# Patient Record
Sex: FEMALE | Race: BLACK OR AFRICAN AMERICAN | NOT HISPANIC OR LATINO | Employment: OTHER | URBAN - METROPOLITAN AREA
[De-identification: names, ages, dates, MRNs, and addresses within clinical notes are randomized per-mention and may not be internally consistent; named-entity substitution may affect disease eponyms.]

---

## 2018-01-17 PROBLEM — R79.9 ABNORMAL BLOOD CHEMISTRY: Status: ACTIVE | Noted: 2018-01-17

## 2019-01-31 PROBLEM — Z00.00 ROUTINE GENERAL MEDICAL EXAMINATION AT A HEALTH CARE FACILITY: Status: ACTIVE | Noted: 2019-01-31

## 2019-01-31 PROBLEM — E88.819 INSULIN RESISTANCE: Status: ACTIVE | Noted: 2019-01-31

## 2019-02-02 PROBLEM — R94.31 ABNORMAL EKG: Status: ACTIVE | Noted: 2019-02-02

## 2019-02-02 PROBLEM — I10 ESSENTIAL HYPERTENSION, BENIGN: Status: ACTIVE | Noted: 2019-02-02

## 2019-02-02 PROBLEM — E03.9 ACQUIRED HYPOTHYROIDISM: Status: ACTIVE | Noted: 2019-02-02

## 2019-02-02 PROBLEM — E55.9 VITAMIN D DEFICIENCY: Status: ACTIVE | Noted: 2019-02-02

## 2019-02-05 PROBLEM — M81.0 AGE-RELATED OSTEOPOROSIS WITHOUT CURRENT PATHOLOGICAL FRACTURE: Status: ACTIVE | Noted: 2019-02-05

## 2019-02-25 PROBLEM — R79.89 ELEVATED PARATHYROID HORMONE: Status: ACTIVE | Noted: 2019-02-25

## 2019-03-11 ENCOUNTER — TELEPHONE (OUTPATIENT)
Dept: ENDOCRINOLOGY | Facility: CLINIC | Age: 59
End: 2019-03-11

## 2019-05-06 PROBLEM — Z00.00 ROUTINE GENERAL MEDICAL EXAMINATION AT A HEALTH CARE FACILITY: Status: RESOLVED | Noted: 2019-01-31 | Resolved: 2019-05-06

## 2019-07-20 PROBLEM — E78.5 HYPERLIPIDEMIA: Status: ACTIVE | Noted: 2019-07-20

## 2019-07-30 ENCOUNTER — OFFICE VISIT (OUTPATIENT)
Dept: ENDOCRINOLOGY | Facility: CLINIC | Age: 59
End: 2019-07-30
Payer: COMMERCIAL

## 2019-07-30 VITALS
SYSTOLIC BLOOD PRESSURE: 124 MMHG | OXYGEN SATURATION: 97 % | BODY MASS INDEX: 33.39 KG/M2 | WEIGHT: 181.44 LBS | HEIGHT: 62 IN | HEART RATE: 68 BPM | DIASTOLIC BLOOD PRESSURE: 78 MMHG

## 2019-07-30 DIAGNOSIS — E04.9 GOITER: ICD-10-CM

## 2019-07-30 DIAGNOSIS — R79.89 HIGH SERUM PARATHYROID HORMONE (PTH): Primary | ICD-10-CM

## 2019-07-30 DIAGNOSIS — M81.0 AGE-RELATED OSTEOPOROSIS WITHOUT CURRENT PATHOLOGICAL FRACTURE: ICD-10-CM

## 2019-07-30 PROCEDURE — 99999 PR PBB SHADOW E&M-EST. PATIENT-LVL III: ICD-10-PCS | Mod: PBBFAC,,, | Performed by: INTERNAL MEDICINE

## 2019-07-30 PROCEDURE — 99244 PR OFFICE CONSULTATION,LEVEL IV: ICD-10-PCS | Mod: S$GLB,,, | Performed by: INTERNAL MEDICINE

## 2019-07-30 PROCEDURE — 3008F PR BODY MASS INDEX (BMI) DOCUMENTED: ICD-10-PCS | Mod: CPTII,S$GLB,, | Performed by: INTERNAL MEDICINE

## 2019-07-30 PROCEDURE — 3078F PR MOST RECENT DIASTOLIC BLOOD PRESSURE < 80 MM HG: ICD-10-PCS | Mod: CPTII,S$GLB,, | Performed by: INTERNAL MEDICINE

## 2019-07-30 PROCEDURE — 99999 PR PBB SHADOW E&M-EST. PATIENT-LVL III: CPT | Mod: PBBFAC,,, | Performed by: INTERNAL MEDICINE

## 2019-07-30 PROCEDURE — 99244 OFF/OP CNSLTJ NEW/EST MOD 40: CPT | Mod: S$GLB,,, | Performed by: INTERNAL MEDICINE

## 2019-07-30 PROCEDURE — 3074F PR MOST RECENT SYSTOLIC BLOOD PRESSURE < 130 MM HG: ICD-10-PCS | Mod: CPTII,S$GLB,, | Performed by: INTERNAL MEDICINE

## 2019-07-30 PROCEDURE — 3078F DIAST BP <80 MM HG: CPT | Mod: CPTII,S$GLB,, | Performed by: INTERNAL MEDICINE

## 2019-07-30 PROCEDURE — 3008F BODY MASS INDEX DOCD: CPT | Mod: CPTII,S$GLB,, | Performed by: INTERNAL MEDICINE

## 2019-07-30 PROCEDURE — 3074F SYST BP LT 130 MM HG: CPT | Mod: CPTII,S$GLB,, | Performed by: INTERNAL MEDICINE

## 2019-07-30 NOTE — PATIENT INSTRUCTIONS
Understanding Primary Hyperparathyroidism    The parathyroid glands are 4 tiny glands in the neck. They make parathyroid hormone (PTH). PTH controls the amount of calcium and phosphorus in your blood. Primary hyperparathyroidism is when there is too much PTH in your blood. It occurs when one or more of the glands are too active.  The job of PTH is to tell the body how to control calcium. Too much PTH means the body increases the amount of calcium in the blood. This leads to a problem called hypercalcemia. This is when the amount of calcium in the blood is too high. Hypercalcemia can cause serious health problems.  Causes  Hyperparathyroidism can occur when a parathyroid gland becomes enlarged. It can also occur as a complication of another health conditions, such as kidney failure or rickets. In these conditions, calcium is usually not high. This is called secondary hyperparathyroidism.  Whos at risk  The risk factors for this condition include:  · Being a woman (its less common in men)  · Being older (its more likely to occur with age)  · Having parents or siblings with the condition or other endocrine tumors  · Having certain kidney problems  · Taking certain medicines  · Having had radiation treatment in the head or neck  Symptoms  Symptoms of the condition can include:  · Muscle weakness  · Depression  · Tiredness  · Confusion and memory loss  · Poor memory  · Nausea and vomiting  · Pain in the stomach area (abdomen)  · Hard stools (constipation)  · Stomach ulcers  · Need to urinate often  · Kidney stones  · Joint or bone pain  · Bone disease (osteopenia or osteoporosis), an increase in bone fractures  · High blood pressure  · Increased thirst  Treatment  If primary hyperparathyroidism is not treated, it can get worse over time. Treatments include:  · Surgery. This may be done to remove any enlarged parathyroid glands. This lets the amount of calcium in the blood go back to normal. You may need to take  vitamin D and calcium supplements before the surgery. This will reduce the risk of low calcium after the surgery.   · Medicine. This lowers the amount of parathyroid hormone made by the overactive glands.   You and your healthcare provider can discuss your treatment options. Make sure to ask any questions you have.  Date Last Reviewed: 11/1/2016  © 8537-6253 [employer]. [address] [place] [address], [place] [address]. All rights reserved. This information is not intended as a substitute for professional medical care. Always follow your healthcare professional's instructions.

## 2019-07-30 NOTE — PROGRESS NOTES
""This note will be shared with the patient"Subjective:       Patient ID: Georgette Betancourt is a 59 y.o. female.  Patient is new to me    Records were reviewed      Chief Complaint: elevated parathyroid hormone      Consultation requested by Dr. Joanie Goldstein    HPI    Patient was found to have an elevated PTH this year, calcium has been normal and vitamin-D adequate.  In the past she did have a vitamin-D deficiency    Osteoporosis  Patient complains of osteoporosis. She was diagnosed with osteoporosis by bone density scan in she reports at least 5 years ago 1 most recent done this year shows osteoporosis with lowest bone density in the. Patient denies history of fracture.The cause of osteoporosis is felt to be due to postmenopausal estrogen deficiency.   She is currently being treated with calcium and vitamin D supplementation.  She is currently being treated with bisphosphonates-states she has only been on Fosamax about 4 months and she admits she may miss doses   Osteoporosis Risk Factors   Nonmodifiable  Personal Hx of fracture as an adult: no  Hx of fracture in first-degree relative: no   race: no  Advanced age: no  Female sex: no  Dementia: no  Poor health/frailty: no       See bone density under media, L-spine negative 3.2 T-score  Potentially modifiable:  Tobacco use: no  Low body weight (<127 lbs): no  Estrogen deficiency     early menopause (age <45) or bilateral ovariectomy: no     prolonged premenopausal amenorrhea (>1 yr): no  Low calcium intake (lifelong):  Possibly  Alcoholism: no  Recurrent falls: no  Inadequate physical activity: no    Current calcium and Vit D intake:  Dietary sources:  Not much dairy  Supplements:    on mvi daily and takes calcium once a week    I have reviewed the past medical, family and social history    Review of Systems   Constitutional: Negative for appetite change, fatigue, fever and unexpected weight change.   HENT: Negative for sore throat and trouble " swallowing.    Eyes: Negative for visual disturbance.   Respiratory: Negative for shortness of breath and wheezing.    Cardiovascular: Negative for chest pain, palpitations and leg swelling.   Gastrointestinal: Negative for diarrhea, nausea and vomiting.   Endocrine: Negative for cold intolerance, heat intolerance, polydipsia, polyphagia and polyuria.   Genitourinary: Negative for difficulty urinating, dysuria and menstrual problem.   Musculoskeletal: Negative for arthralgias and joint swelling.   Skin: Negative for rash.   Neurological: Negative for dizziness, weakness, numbness and headaches.   Psychiatric/Behavioral: Negative for confusion, dysphoric mood and sleep disturbance.       Objective:      Physical Exam   Constitutional: She is oriented to person, place, and time. She appears well-developed and well-nourished. No distress.   HENT:   Head: Normocephalic and atraumatic.   Right Ear: External ear normal.   Left Ear: External ear normal.   Nose: Nose normal.   Mouth/Throat: Oropharynx is clear and moist. No oropharyngeal exudate.   Eyes: Pupils are equal, round, and reactive to light. Conjunctivae and EOM are normal. No scleral icterus.   Neck: No JVD present. No tracheal deviation present. Thyromegaly present.   Cardiovascular: Normal rate, regular rhythm, normal heart sounds and intact distal pulses. Exam reveals no gallop and no friction rub.   No murmur heard.  Pulmonary/Chest: Effort normal and breath sounds normal. No respiratory distress. She has no wheezes. She has no rales.   Abdominal: Soft. Bowel sounds are normal. She exhibits no distension and no mass. There is no tenderness. There is no rebound and no guarding. No hernia.   Musculoskeletal: She exhibits no edema or deformity.   Lymphadenopathy:     She has no cervical adenopathy.   Neurological: She is alert and oriented to person, place, and time. She has normal reflexes. No cranial nerve deficit.   Skin: Skin is warm. No rash noted. She is  not diaphoretic. No erythema.   Psychiatric: She has a normal mood and affect. Her behavior is normal.   Vitals reviewed.        Lab Review:   Clinical Support on 05/30/2019   Component Date Value    Vit D, 25-Hydroxy 05/30/2019 50.4     Cholesterol 05/30/2019 207*    Triglycerides 05/30/2019 95     HDL 05/30/2019 57     VLDL Cholesterol Richie 05/30/2019 19     LDL Calculated 05/30/2019 131*    Glucose 05/30/2019 94     BUN, Bld 05/30/2019 10     Creatinine 05/30/2019 0.94     eGFR if non African Amer* 05/30/2019 66     eGFR if African American 05/30/2019 76     BUN/Creatinine Ratio 05/30/2019 11*    Sodium 05/30/2019 141     Potassium 05/30/2019 4.4     Chloride 05/30/2019 100     CO2 05/30/2019 27     Calcium 05/30/2019 9.4     Total Protein 05/30/2019 7.1     Albumin 05/30/2019 4.3     Globulin, Total 05/30/2019 2.8     Albumin/Globulin Ratio 05/30/2019 1.5     Total Bilirubin 05/30/2019 0.6     Alkaline Phosphatase 05/30/2019 89     AST 05/30/2019 22     ALT 05/30/2019 19     A1c 05/30/2019 5.6     Insulin 05/30/2019 CANCELED     Microalb, Ur 05/30/2019 10.5     TSH 05/30/2019 0.062*    T3, Free 05/30/2019 3.4     T4, Free 05/30/2019 1.31    Office Visit on 02/21/2019   Component Date Value    Calcium, Ion 02/21/2019 5.1     PTH, Intact 02/21/2019 98*    Phosphorus 02/21/2019 3.1     Magnesium 02/21/2019 2.4*   Office Visit on 01/31/2019   Component Date Value    Microalbumin Urine Random 01/31/2019 80 mg/l*    Spec Grav UA 01/31/2019 1.020     pH, UA 01/31/2019 6.5     WBC, UA 01/31/2019 500*    Blood, UA 01/31/2019 10*    Nitrite, UA 01/31/2019 neg     Ketones, UA 01/31/2019 neg     Bilirubin 01/31/2019 neg     Urobilinogen, UA 01/31/2019 1*    Protein 01/31/2019 15*    Glucose, UA 01/31/2019 neg     Casts 01/31/2019 None Seen     Bacteria 01/31/2019 1-5     Crystals 01/31/2019 None Seen     White Blood Cells 01/31/2019 20-25*    RBC Cells Counted 01/31/2019  0-2*    A1c 01/31/2019 5.8     Urine Culture, Routine 01/31/2019 Final report     Result 1 01/31/2019 No growth        Assessment:     1. High serum parathyroid hormone (PTH)     2. Age-related osteoporosis without current pathological fracture  PTH, intact    Calcium    Calcium, Timed Urine    Creatinine, urine, timed 24 Hours    Phosphorus   3. Goiter  US Soft Tissue Head Neck Thyroid    I discussed the pathophysiology hyperparathyroidism.  She may have a variant of hyperparathyroidism with normal calcium but in light of her osteoporosis can consider surgical intervention in the future.  She wants to think about surgery.  She has only been on Fosamax for a short time and she has not been compliant.  Encourage compliance but also discussed possibly switching to IV Reclast.  For now can take a multivitamin and vitamin-D daily but may add 1 dose of calcium if her calcium remains normal.  Check labs below    Would avoid suppressed TSH with thyroid hormone replacement to avoid worsening of osteoporosis    Check thyroid ultrasound for further evaluation of enlargement  Plan:   High serum parathyroid hormone (PTH)    Age-related osteoporosis without current pathological fracture  -     PTH, intact; Future; Expected date: 07/30/2019  -     Calcium; Future; Expected date: 07/30/2019  -     Calcium, Timed Urine; Future  -     Creatinine, urine, timed 24 Hours; Future  -     Phosphorus; Future; Expected date: 07/30/2019    Goiter  -     US Soft Tissue Head Neck Thyroid; Future; Expected date: 07/30/2019          No follow-ups on file.

## 2019-07-30 NOTE — LETTER
July 30, 2019      Joanie Goldstein MD  [address]  [place] [address]           [facility]  [address] [facility]  [place] [address]  Phone: [phone]  Fax: [phone]          Patient: Georgette Betancourt   MR Number: [id number]   YOB: 1960   Date of Visit: 7/30/2019       Dear Dr. Joanie Goldstein:    Thank you for referring Georgette Betancourt to me for evaluation. Attached you will find relevant portions of my assessment and plan of care.    If you have questions, please do not hesitate to call me. I look forward to following Georgette Betancourt along with you.    Sincerely,    Judy Sullivan MD    Enclosure  CC:  No Recipients    If you would like to receive this communication electronically, please contact [email or web address] or [phone] to request more information on [employer] access.    For providers and/or their staff who would like to refer a patient to [facility], please contact us through our one-stop-shop provider referral line, [facility], at [phone].    If you feel you have received this communication in error or would no longer like to receive these types of communications, please e-mail [email or web address]

## 2019-07-31 ENCOUNTER — LAB VISIT (OUTPATIENT)
Dept: LAB | Facility: HOSPITAL | Age: 59
End: 2019-07-31
Payer: COMMERCIAL

## 2019-07-31 DIAGNOSIS — M81.0 AGE-RELATED OSTEOPOROSIS WITHOUT CURRENT PATHOLOGICAL FRACTURE: ICD-10-CM

## 2019-07-31 LAB
CALCIUM SERPL-MCNC: 9.9 MG/DL (ref 8.7–10.5)
PHOSPHATE SERPL-MCNC: 3.3 MG/DL (ref 2.7–4.5)
PTH-INTACT SERPL-MCNC: 112 PG/ML (ref 9–77)

## 2019-07-31 PROCEDURE — 83970 ASSAY OF PARATHORMONE: CPT

## 2019-07-31 PROCEDURE — 36415 COLL VENOUS BLD VENIPUNCTURE: CPT

## 2019-07-31 PROCEDURE — 84100 ASSAY OF PHOSPHORUS: CPT

## 2019-07-31 PROCEDURE — 82310 ASSAY OF CALCIUM: CPT

## 2019-08-05 ENCOUNTER — TELEPHONE (OUTPATIENT)
Dept: RADIOLOGY | Facility: HOSPITAL | Age: 59
End: 2019-08-05

## 2019-08-05 ENCOUNTER — LAB VISIT (OUTPATIENT)
Dept: LAB | Facility: HOSPITAL | Age: 59
End: 2019-08-05
Payer: COMMERCIAL

## 2019-08-05 DIAGNOSIS — M81.0 AGE-RELATED OSTEOPOROSIS WITHOUT CURRENT PATHOLOGICAL FRACTURE: ICD-10-CM

## 2019-08-05 PROCEDURE — 82340 ASSAY OF CALCIUM IN URINE: CPT

## 2019-08-05 PROCEDURE — 82570 ASSAY OF URINE CREATININE: CPT

## 2019-08-06 ENCOUNTER — HOSPITAL ENCOUNTER (OUTPATIENT)
Dept: RADIOLOGY | Facility: HOSPITAL | Age: 59
Discharge: HOME | End: 2019-08-06
Attending: INTERNAL MEDICINE
Payer: COMMERCIAL

## 2019-08-06 DIAGNOSIS — E04.9 GOITER: ICD-10-CM

## 2019-08-06 LAB
CALCIUM 24H UR-MRATE: 6 MG/HR (ref 4–12)
CALCIUM UR-MCNC: 7 MG/DL (ref 0–15)
CALCIUM URINE (MG/SPEC): 154 MG/SPEC
CREAT 24H UR-MRATE: 49.5 MG/HR (ref 40–75)
CREAT UR-MCNC: 54 MG/DL (ref 15–325)
CREATININE, URINE (MG/SPEC): 1188 MG/SPEC
URINE COLLECTION DURATION: 24 HR
URINE COLLECTION DURATION: 24 HR
URINE VOLUME: 2200 ML
URINE VOLUME: 2200 ML

## 2019-08-06 PROCEDURE — 76536 US EXAM OF HEAD AND NECK: CPT | Mod: TC

## 2019-08-06 PROCEDURE — 76536 US EXAM OF HEAD AND NECK: CPT | Mod: 26,,, | Performed by: RADIOLOGY

## 2019-08-06 PROCEDURE — 76536 US SOFT TISSUE HEAD NECK THYROID: ICD-10-PCS | Mod: 26,,, | Performed by: RADIOLOGY

## 2019-08-09 ENCOUNTER — TELEPHONE (OUTPATIENT)
Dept: NEUROLOGY | Facility: CLINIC | Age: 59
End: 2019-08-09

## 2019-08-09 NOTE — TELEPHONE ENCOUNTER
----- Message from Judy Sullivan MD sent at 8/7/2019  3:54 PM CDT -----  Notify patient her labs are stable and will continue to follow her PTH and calcium levels over time.  Twenty-four urine was fine and thyroid ultrasound was normal. Please schedule for six-month follow-up appointment

## 2019-08-20 ENCOUNTER — TELEPHONE (OUTPATIENT)
Dept: ENDOCRINOLOGY | Facility: CLINIC | Age: 59
End: 2019-08-20

## 2019-08-20 NOTE — TELEPHONE ENCOUNTER
Notify patient her labs are stable and will continue to follow her PTH and calcium levels over time.  Twenty-four urine was fine and thyroid ultrasound was normal. Please schedule for six-month follow-up appointment    ----- Message from Boaz Gunderson sent at 8/20/2019 12:56 PM CDT -----  Contact: pt   Type:  Patient Returning Call    Who Called:DANTE BISHOP  Who Left Message for Patient: nurse   Does the patient know what this is regarding?:   Would the patient rather a call back or a response via [facility]?  Call   Best Call Back Number: [phone]    Additional Information:

## 2019-08-20 NOTE — TELEPHONE ENCOUNTER
Notify patient her labs are stable and will continue to follow her PTH and calcium levels over time.  Twenty-four urine was fine and thyroid ultrasound was normal. Please schedule for six-month follow-up appointment

## 2019-08-21 ENCOUNTER — TELEPHONE (OUTPATIENT)
Dept: ENDOCRINOLOGY | Facility: CLINIC | Age: 59
End: 2019-08-21

## 2019-08-21 NOTE — TELEPHONE ENCOUNTER
Called pt and relayed lab results   Pt verbalized  understanding and will make 6mo f/u appt through pt portal

## 2020-10-12 PROBLEM — R79.9 ABNORMAL BLOOD CHEMISTRY: Status: RESOLVED | Noted: 2018-01-17 | Resolved: 2020-10-12

## 2021-10-26 ENCOUNTER — OFFICE VISIT (OUTPATIENT)
Dept: GASTROENTEROLOGY | Facility: CLINIC | Age: 61
End: 2021-10-26
Payer: COMMERCIAL

## 2021-10-26 VITALS
HEART RATE: 64 BPM | SYSTOLIC BLOOD PRESSURE: 130 MMHG | WEIGHT: 171.06 LBS | BODY MASS INDEX: 30.3 KG/M2 | DIASTOLIC BLOOD PRESSURE: 76 MMHG

## 2021-10-26 DIAGNOSIS — I10 HYPERTENSION, UNSPECIFIED TYPE: ICD-10-CM

## 2021-10-26 DIAGNOSIS — Z12.11 COLON CANCER SCREENING: Primary | ICD-10-CM

## 2021-10-26 DIAGNOSIS — Z80.0 FAMILY HISTORY OF COLON CANCER: ICD-10-CM

## 2021-10-26 PROCEDURE — 99203 OFFICE O/P NEW LOW 30 MIN: CPT | Mod: S$GLB,,, | Performed by: NURSE PRACTITIONER

## 2021-10-26 PROCEDURE — 3061F PR NEG MICROALBUMINURIA RESULT DOCUMENTED/REVIEW: ICD-10-PCS | Mod: CPTII,S$GLB,, | Performed by: NURSE PRACTITIONER

## 2021-10-26 PROCEDURE — 1160F RVW MEDS BY RX/DR IN RCRD: CPT | Mod: CPTII,S$GLB,, | Performed by: NURSE PRACTITIONER

## 2021-10-26 PROCEDURE — 3066F NEPHROPATHY DOC TX: CPT | Mod: CPTII,S$GLB,, | Performed by: NURSE PRACTITIONER

## 2021-10-26 PROCEDURE — 1159F PR MEDICATION LIST DOCUMENTED IN MEDICAL RECORD: ICD-10-PCS | Mod: CPTII,S$GLB,, | Performed by: NURSE PRACTITIONER

## 2021-10-26 PROCEDURE — 3078F PR MOST RECENT DIASTOLIC BLOOD PRESSURE < 80 MM HG: ICD-10-PCS | Mod: CPTII,S$GLB,, | Performed by: NURSE PRACTITIONER

## 2021-10-26 PROCEDURE — 3008F BODY MASS INDEX DOCD: CPT | Mod: CPTII,S$GLB,, | Performed by: NURSE PRACTITIONER

## 2021-10-26 PROCEDURE — 3008F PR BODY MASS INDEX (BMI) DOCUMENTED: ICD-10-PCS | Mod: CPTII,S$GLB,, | Performed by: NURSE PRACTITIONER

## 2021-10-26 PROCEDURE — 3066F PR DOCUMENTATION OF TREATMENT FOR NEPHROPATHY: ICD-10-PCS | Mod: CPTII,S$GLB,, | Performed by: NURSE PRACTITIONER

## 2021-10-26 PROCEDURE — 3061F NEG MICROALBUMINURIA REV: CPT | Mod: CPTII,S$GLB,, | Performed by: NURSE PRACTITIONER

## 2021-10-26 PROCEDURE — 3075F PR MOST RECENT SYSTOLIC BLOOD PRESS GE 130-139MM HG: ICD-10-PCS | Mod: CPTII,S$GLB,, | Performed by: NURSE PRACTITIONER

## 2021-10-26 PROCEDURE — 99203 PR OFFICE/OUTPT VISIT, NEW, LEVL III, 30-44 MIN: ICD-10-PCS | Mod: S$GLB,,, | Performed by: NURSE PRACTITIONER

## 2021-10-26 PROCEDURE — 3075F SYST BP GE 130 - 139MM HG: CPT | Mod: CPTII,S$GLB,, | Performed by: NURSE PRACTITIONER

## 2021-10-26 PROCEDURE — 99999 PR PBB SHADOW E&M-EST. PATIENT-LVL III: ICD-10-PCS | Mod: PBBFAC,,, | Performed by: NURSE PRACTITIONER

## 2021-10-26 PROCEDURE — 3078F DIAST BP <80 MM HG: CPT | Mod: CPTII,S$GLB,, | Performed by: NURSE PRACTITIONER

## 2021-10-26 PROCEDURE — 99999 PR PBB SHADOW E&M-EST. PATIENT-LVL III: CPT | Mod: PBBFAC,,, | Performed by: NURSE PRACTITIONER

## 2021-10-26 PROCEDURE — 1159F MED LIST DOCD IN RCRD: CPT | Mod: CPTII,S$GLB,, | Performed by: NURSE PRACTITIONER

## 2021-10-26 PROCEDURE — 1160F PR REVIEW ALL MEDS BY PRESCRIBER/CLIN PHARMACIST DOCUMENTED: ICD-10-PCS | Mod: CPTII,S$GLB,, | Performed by: NURSE PRACTITIONER

## 2021-10-26 RX ORDER — SOD SULF/POT CHLORIDE/MAG SULF 1.479 G
12 TABLET ORAL DAILY
Qty: 24 TABLET | Refills: 0 | Status: SHIPPED | OUTPATIENT
Start: 2021-10-26 | End: 2022-07-05

## 2021-11-16 ENCOUNTER — TELEPHONE (OUTPATIENT)
Dept: PREADMISSION TESTING | Facility: HOSPITAL | Age: 61
End: 2021-11-16
Payer: COMMERCIAL

## 2021-11-19 ENCOUNTER — ANESTHESIA EVENT (OUTPATIENT)
Dept: ENDOSCOPY | Facility: HOSPITAL | Age: 61
End: 2021-11-19
Payer: COMMERCIAL

## 2021-11-23 ENCOUNTER — HOSPITAL ENCOUNTER (OUTPATIENT)
Facility: HOSPITAL | Age: 61
Discharge: HOME | End: 2021-11-23
Attending: INTERNAL MEDICINE | Admitting: INTERNAL MEDICINE
Payer: COMMERCIAL

## 2021-11-23 ENCOUNTER — ANESTHESIA (OUTPATIENT)
Dept: ENDOSCOPY | Facility: HOSPITAL | Age: 61
End: 2021-11-23
Payer: COMMERCIAL

## 2021-11-23 VITALS
RESPIRATION RATE: 17 BRPM | WEIGHT: 172.63 LBS | HEIGHT: 63 IN | DIASTOLIC BLOOD PRESSURE: 73 MMHG | TEMPERATURE: 97 F | OXYGEN SATURATION: 100 % | SYSTOLIC BLOOD PRESSURE: 150 MMHG | HEART RATE: 65 BPM | BODY MASS INDEX: 30.59 KG/M2

## 2021-11-23 DIAGNOSIS — Z12.11 COLON CANCER SCREENING: Primary | ICD-10-CM

## 2021-11-23 PROCEDURE — 37000008 HC ANESTHESIA 1ST 15 MINUTES: Performed by: INTERNAL MEDICINE

## 2021-11-23 PROCEDURE — D9220A PRA ANESTHESIA: ICD-10-PCS | Mod: ,,, | Performed by: STUDENT IN AN ORGANIZED HEALTH CARE EDUCATION/TRAINING PROGRAM

## 2021-11-23 PROCEDURE — G0121 COLON CA SCRN NOT HI RSK IND: HCPCS | Performed by: INTERNAL MEDICINE

## 2021-11-23 PROCEDURE — 25000003 PHARM REV CODE 250: Performed by: NURSE ANESTHETIST, CERTIFIED REGISTERED

## 2021-11-23 PROCEDURE — D9220A PRA ANESTHESIA: Mod: ,,, | Performed by: STUDENT IN AN ORGANIZED HEALTH CARE EDUCATION/TRAINING PROGRAM

## 2021-11-23 PROCEDURE — 63600175 PHARM REV CODE 636 W HCPCS: Performed by: NURSE ANESTHETIST, CERTIFIED REGISTERED

## 2021-11-23 PROCEDURE — G0121 COLON CA SCRN NOT HI RSK IND: ICD-10-PCS | Mod: ,,, | Performed by: INTERNAL MEDICINE

## 2021-11-23 PROCEDURE — G0121 COLON CA SCRN NOT HI RSK IND: HCPCS | Mod: ,,, | Performed by: INTERNAL MEDICINE

## 2021-11-23 PROCEDURE — 37000009 HC ANESTHESIA EA ADD 15 MINS: Performed by: INTERNAL MEDICINE

## 2021-11-23 PROCEDURE — 63600175 PHARM REV CODE 636 W HCPCS: Performed by: INTERNAL MEDICINE

## 2021-11-23 PROCEDURE — D9220A PRA ANESTHESIA: ICD-10-PCS | Mod: ,,, | Performed by: NURSE ANESTHETIST, CERTIFIED REGISTERED

## 2021-11-23 PROCEDURE — D9220A PRA ANESTHESIA: Mod: ,,, | Performed by: NURSE ANESTHETIST, CERTIFIED REGISTERED

## 2021-11-23 RX ORDER — SODIUM CHLORIDE, SODIUM LACTATE, POTASSIUM CHLORIDE, CALCIUM CHLORIDE 600; 310; 30; 20 MG/100ML; MG/100ML; MG/100ML; MG/100ML
INJECTION, SOLUTION INTRAVENOUS CONTINUOUS
Status: DISCONTINUED | OUTPATIENT
Start: 2021-11-23 | End: 2021-11-23 | Stop reason: HOSPADM

## 2021-11-23 RX ORDER — LIDOCAINE HYDROCHLORIDE 20 MG/ML
INJECTION, SOLUTION EPIDURAL; INFILTRATION; INTRACAUDAL; PERINEURAL
Status: DISCONTINUED | OUTPATIENT
Start: 2021-11-23 | End: 2021-11-23

## 2021-11-23 RX ORDER — PROPOFOL 10 MG/ML
VIAL (ML) INTRAVENOUS
Status: DISCONTINUED | OUTPATIENT
Start: 2021-11-23 | End: 2021-11-23

## 2021-11-23 RX ADMIN — GLYCOPYRROLATE 0.2 MG: 0.2 INJECTION, SOLUTION INTRAMUSCULAR; INTRAVITREAL at 09:11

## 2021-11-23 RX ADMIN — PROPOFOL 50 MG: 10 INJECTION, EMULSION INTRAVENOUS at 09:11

## 2021-11-23 RX ADMIN — SODIUM CHLORIDE, SODIUM LACTATE, POTASSIUM CHLORIDE, AND CALCIUM CHLORIDE: 600; 310; 30; 20 INJECTION, SOLUTION INTRAVENOUS at 09:11

## 2021-11-23 RX ADMIN — PROPOFOL 20 MG: 10 INJECTION, EMULSION INTRAVENOUS at 09:11

## 2021-11-23 RX ADMIN — PROPOFOL 100 MG: 10 INJECTION, EMULSION INTRAVENOUS at 09:11

## 2021-11-23 RX ADMIN — LIDOCAINE HYDROCHLORIDE 60 MG: 20 INJECTION, SOLUTION EPIDURAL; INFILTRATION; INTRACAUDAL; PERINEURAL at 09:11

## 2021-11-23 RX ADMIN — PROPOFOL 30 MG: 10 INJECTION, EMULSION INTRAVENOUS at 09:11

## 2021-11-24 LAB — POCT GLUCOSE: 97 MG/DL (ref 70–110)

## 2022-07-05 ENCOUNTER — OFFICE VISIT (OUTPATIENT)
Dept: OBSTETRICS AND GYNECOLOGY | Facility: CLINIC | Age: 62
End: 2022-07-05
Payer: COMMERCIAL

## 2022-07-05 VITALS
SYSTOLIC BLOOD PRESSURE: 128 MMHG | WEIGHT: 172.38 LBS | DIASTOLIC BLOOD PRESSURE: 68 MMHG | BODY MASS INDEX: 30.54 KG/M2

## 2022-07-05 DIAGNOSIS — Z01.419 ENCOUNTER FOR GYNECOLOGICAL EXAMINATION WITHOUT ABNORMAL FINDING: Primary | ICD-10-CM

## 2022-07-05 PROCEDURE — 3044F HG A1C LEVEL LT 7.0%: CPT | Mod: CPTII,S$GLB,, | Performed by: NURSE PRACTITIONER

## 2022-07-05 PROCEDURE — 3074F PR MOST RECENT SYSTOLIC BLOOD PRESSURE < 130 MM HG: ICD-10-PCS | Mod: CPTII,S$GLB,, | Performed by: NURSE PRACTITIONER

## 2022-07-05 PROCEDURE — 3008F PR BODY MASS INDEX (BMI) DOCUMENTED: ICD-10-PCS | Mod: CPTII,S$GLB,, | Performed by: NURSE PRACTITIONER

## 2022-07-05 PROCEDURE — 99386 PREV VISIT NEW AGE 40-64: CPT | Mod: S$GLB,,, | Performed by: NURSE PRACTITIONER

## 2022-07-05 PROCEDURE — 3078F DIAST BP <80 MM HG: CPT | Mod: CPTII,S$GLB,, | Performed by: NURSE PRACTITIONER

## 2022-07-05 PROCEDURE — 3078F PR MOST RECENT DIASTOLIC BLOOD PRESSURE < 80 MM HG: ICD-10-PCS | Mod: CPTII,S$GLB,, | Performed by: NURSE PRACTITIONER

## 2022-07-05 PROCEDURE — 3008F BODY MASS INDEX DOCD: CPT | Mod: CPTII,S$GLB,, | Performed by: NURSE PRACTITIONER

## 2022-07-05 PROCEDURE — 99999 PR PBB SHADOW E&M-EST. PATIENT-LVL III: ICD-10-PCS | Mod: PBBFAC,,, | Performed by: NURSE PRACTITIONER

## 2022-07-05 PROCEDURE — 1159F PR MEDICATION LIST DOCUMENTED IN MEDICAL RECORD: ICD-10-PCS | Mod: CPTII,S$GLB,, | Performed by: NURSE PRACTITIONER

## 2022-07-05 PROCEDURE — 3074F SYST BP LT 130 MM HG: CPT | Mod: CPTII,S$GLB,, | Performed by: NURSE PRACTITIONER

## 2022-07-05 PROCEDURE — 1160F PR REVIEW ALL MEDS BY PRESCRIBER/CLIN PHARMACIST DOCUMENTED: ICD-10-PCS | Mod: CPTII,S$GLB,, | Performed by: NURSE PRACTITIONER

## 2022-07-05 PROCEDURE — 3044F PR MOST RECENT HEMOGLOBIN A1C LEVEL <7.0%: ICD-10-PCS | Mod: CPTII,S$GLB,, | Performed by: NURSE PRACTITIONER

## 2022-07-05 PROCEDURE — 1159F MED LIST DOCD IN RCRD: CPT | Mod: CPTII,S$GLB,, | Performed by: NURSE PRACTITIONER

## 2022-07-05 PROCEDURE — 99386 PR PREVENTIVE VISIT,NEW,40-64: ICD-10-PCS | Mod: S$GLB,,, | Performed by: NURSE PRACTITIONER

## 2022-07-05 PROCEDURE — 1160F RVW MEDS BY RX/DR IN RCRD: CPT | Mod: CPTII,S$GLB,, | Performed by: NURSE PRACTITIONER

## 2022-07-05 PROCEDURE — 99999 PR PBB SHADOW E&M-EST. PATIENT-LVL III: CPT | Mod: PBBFAC,,, | Performed by: NURSE PRACTITIONER

## 2022-07-05 NOTE — PROGRESS NOTES
CC: Well woman exam    Georgette Betancourt is a 62 y.o. female  presents for a well woman exam.  No issues, problems, or complaints.    New pt, was seeing Dr. Li Franz who no longer practices  She [facility] mmg [facility],   Has had hysterectomy retains ovaries     Past Medical History:   Diagnosis Date    Acquired hypothyroidism     Anemia     Encounter for general adult medical examination without abnormal findings 2016    Essential hypertension     Metabolic syndrome X     Osteopenia     Vitamin D deficiency      Past Surgical History:   Procedure Laterality Date    [facility]SY [facility]     [facility] will [facility] with [facility]     SECTION      COLONOSCOPY N/A 2021    Procedure: COLONOSCOPY;  Surgeon: Kami Castellon MD;  Location: [facility];  Service: Endoscopy;  Laterality: N/A;    HYSTERECTOMY  2020    retains ovaries - done for Bleeding with Dr. Franz    TUBAL LIGATION       Family History   Problem Relation Age of Onset    Hypertension Mother     Diabetes Mother     Stroke Father     Heart disease Brother      Social History     Tobacco Use    Smoking status: Former Smoker    Smokeless tobacco: Never Used   Substance Use Topics    Alcohol use: Not Currently    Drug use: No     OB History        3    Para   3    Term   3            AB        Living   3       SAB        IAB        Ectopic        Multiple        Live Births   3                 /68   Wt 78.2 kg (172 lb 6.4 oz)   BMI 30.54 kg/m²     ROS:  GENERAL: Denies weight gain or weight loss. Feeling well overall.   SKIN: Denies rash or lesions.   HEAD: Denies head injury or headache.   NODES: Denies enlarged lymph nodes.   CHEST: Denies chest pain or shortness of breath.   CARDIOVASCULAR: Denies palpitations or left sided chest pain.   ABDOMEN: No abdominal pain, constipation, diarrhea, nausea, vomiting or rectal bleeding.   URINARY: No frequency, dysuria, hematuria, or  burning on urination.  REPRODUCTIVE: See HPI.   BREASTS: The patient performs breast self-examination and denies pain, lumps, or nipple discharge.   HEMATOLOGIC: No easy bruisability or excessive bleeding.   MUSCULOSKELETAL: Denies joint pain or swelling.   NEUROLOGIC: Denies syncope or weakness.   PSYCHIATRIC: Denies depression, anxiety or mood swings.    PE:   APPEARANCE: Well nourished, well developed, in no acute distress.  AFFECT: WNL, alert and oriented x 3.  SKIN: No acne or hirsutism.  NECK: Neck symmetric without masses or thyromegaly.  NODES: No inguinal, cervical, axillary or femoral lymph node enlargement.  CHEST: Good respiratory effort.   ABDOMEN: Soft. No tenderness or masses. No hepatosplenomegaly. No hernias.  BREASTS: Symmetrical, no skin changes or visible lesions. No palpable masses, nipple discharge bilaterally.  PELVIC: Normal external female genitalia without lesions. Normal hair distribution. Adequate perineal body, normal urethral meatus. Vagina atrophic without lesions or discharge. No significant cystocele or rectocele. Bimanual exam shows uterus and cervix to be surgically absent. Adnexa without masses or tenderness.      1. Encounter for gynecological examination without abnormal finding      and PLAN:    Patient was counseled today on A.C.S. Pap guidelines and recommendations for yearly pelvic exams, mammograms and monthly self breast exams; to see her PCP for other health maintenance.

## 2022-07-08 ENCOUNTER — TELEPHONE (OUTPATIENT)
Dept: OBSTETRICS AND GYNECOLOGY | Facility: CLINIC | Age: 62
End: 2022-07-08
Payer: COMMERCIAL

## 2022-07-08 DIAGNOSIS — Z12.31 ENCOUNTER FOR SCREENING MAMMOGRAM FOR BREAST CANCER: Primary | ICD-10-CM

## 2022-07-08 NOTE — TELEPHONE ENCOUNTER
----- Message from Angela Franks sent at 7/8/2022  2:08 PM CDT -----  Please call pt @ [phone] regarding an order to get mammograph at [facility]

## 2022-07-08 NOTE — TELEPHONE ENCOUNTER
mmg order is in chart - please print and pt may have to come    She also can do mmg in [facility]

## 2022-07-13 ENCOUNTER — CLINICAL SUPPORT (OUTPATIENT)
Dept: URGENT CARE | Facility: CLINIC | Age: 62
End: 2022-07-13
Payer: COMMERCIAL

## 2022-07-13 DIAGNOSIS — Z11.52 ENCOUNTER FOR SCREENING FOR COVID-19: Primary | ICD-10-CM

## 2022-07-13 LAB — SARS-COV-2 RNA RESP QL NAA+PROBE: NOT DETECTED

## 2022-07-13 PROCEDURE — U0003 INFECTIOUS AGENT DETECTION BY NUCLEIC ACID (DNA OR RNA); SEVERE ACUTE RESPIRATORY SYNDROME CORONAVIRUS 2 (SARS-COV-2) (CORONAVIRUS DISEASE [COVID-19]), AMPLIFIED PROBE TECHNIQUE, MAKING USE OF HIGH THROUGHPUT TECHNOLOGIES AS DESCRIBED BY CMS-2020-01-R: HCPCS | Performed by: NURSE PRACTITIONER

## 2022-07-13 PROCEDURE — 99211 PR OFFICE/OUTPT VISIT, EST, LEVL I: ICD-10-PCS | Mod: S$GLB,,, | Performed by: NURSE PRACTITIONER

## 2022-07-13 PROCEDURE — 99211 OFF/OP EST MAY X REQ PHY/QHP: CPT | Mod: S$GLB,,, | Performed by: NURSE PRACTITIONER

## 2022-07-13 PROCEDURE — U0005 INFEC AGEN DETEC AMPLI PROBE: HCPCS | Performed by: NURSE PRACTITIONER

## 2023-01-05 ENCOUNTER — PATIENT MESSAGE (OUTPATIENT)
Dept: RESEARCH | Facility: HOSPITAL | Age: 63
End: 2023-01-05
Payer: COMMERCIAL

## 2023-03-13 ENCOUNTER — PATIENT MESSAGE (OUTPATIENT)
Dept: PAIN MEDICINE | Facility: CLINIC | Age: 63
End: 2023-03-13
Payer: COMMERCIAL

## 2023-08-08 ENCOUNTER — TELEPHONE (OUTPATIENT)
Dept: OBSTETRICS AND GYNECOLOGY | Facility: CLINIC | Age: 63
End: 2023-08-08
Payer: COMMERCIAL

## 2023-08-08 NOTE — TELEPHONE ENCOUNTER
Left message for patient to return call to [phone].     Pt needs annual exam in order to schedule mammogram

## 2023-08-08 NOTE — TELEPHONE ENCOUNTER
----- Message from Marilee Magana sent at 8/8/2023  9:52 AM CDT -----  Contact: Rose Mary  Patient is calling to request mammogram order, please notify pt when order is placed / ready to schedule at .[phone]          Thanks

## 2023-11-09 ENCOUNTER — TELEPHONE (OUTPATIENT)
Dept: OBSTETRICS AND GYNECOLOGY | Facility: CLINIC | Age: 63
End: 2023-11-09
Payer: COMMERCIAL

## 2023-11-28 ENCOUNTER — OFFICE VISIT (OUTPATIENT)
Dept: OBSTETRICS AND GYNECOLOGY | Facility: CLINIC | Age: 63
End: 2023-11-28
Payer: COMMERCIAL

## 2023-11-28 VITALS
BODY MASS INDEX: 30.87 KG/M2 | HEIGHT: 62 IN | DIASTOLIC BLOOD PRESSURE: 76 MMHG | SYSTOLIC BLOOD PRESSURE: 128 MMHG | WEIGHT: 167.75 LBS

## 2023-11-28 DIAGNOSIS — Z12.31 ENCOUNTER FOR SCREENING MAMMOGRAM FOR BREAST CANCER: ICD-10-CM

## 2023-11-28 DIAGNOSIS — Z01.419 ENCOUNTER FOR GYNECOLOGICAL EXAMINATION WITHOUT ABNORMAL FINDING: Primary | ICD-10-CM

## 2023-11-28 PROCEDURE — G0101 CA SCREEN;PELVIC/BREAST EXAM: HCPCS | Mod: PBBFAC | Performed by: NURSE PRACTITIONER

## 2023-11-28 PROCEDURE — G0101 PR CA SCREEN;PELVIC/BREAST EXAM: ICD-10-PCS | Mod: S$PBB,,, | Performed by: NURSE PRACTITIONER

## 2023-11-28 PROCEDURE — G0101 CA SCREEN;PELVIC/BREAST EXAM: HCPCS | Mod: S$PBB,,, | Performed by: NURSE PRACTITIONER

## 2023-11-28 PROCEDURE — 99999 PR PBB SHADOW E&M-EST. PATIENT-LVL III: ICD-10-PCS | Mod: PBBFAC,,, | Performed by: NURSE PRACTITIONER

## 2023-11-28 PROCEDURE — 99999 PR PBB SHADOW E&M-EST. PATIENT-LVL III: CPT | Mod: PBBFAC,,, | Performed by: NURSE PRACTITIONER

## 2023-11-28 PROCEDURE — 99213 OFFICE O/P EST LOW 20 MIN: CPT | Mod: PBBFAC,25 | Performed by: NURSE PRACTITIONER

## 2023-11-28 NOTE — PROGRESS NOTES
"CC: Well woman exam    Georgette Betancourt is a 63 y.o. female  presents for a well woman exam.    Needs mmg scheduled - order placed    Past Medical History:   Diagnosis Date    Acquired hypothyroidism     Anemia     Encounter for general adult medical examination without abnormal findings 2016    Essential hypertension     Metabolic syndrome X     Osteopenia     Vitamin D deficiency      Past Surgical History:   Procedure Laterality Date    BREAST [facility]OPSY Right     [facility] - will do mmg with them     SECTION      COLONOSCOPY N/A 2021    Procedure: COLONOSCOPY;  Surgeon: Kami Castellon MD;  Location: [facility];  Service: Endoscopy;  Laterality: N/A;    HYSTERECTOMY  2020    retains ovaries - done for Bleeding with Dr. Franz    TUBAL LIGATION       Family History   Problem Relation Age of Onset    Hypertension Mother     Diabetes Mother     Stroke Father     Heart disease Brother      Social History     Tobacco Use    Smoking status: Former    Smokeless tobacco: Never   Substance Use Topics    Alcohol use: Not Currently    Drug use: No     OB History          3    Para   3    Term   3            AB        Living   3         SAB        IAB        Ectopic        Multiple        Live Births   3                 /76 (BP Location: Left arm, Patient Position: Sitting, BP Method: Medium (Manual))   Ht 5' 2" (1.575 m)   Wt 76.1 kg (167 lb 12.3 oz)   BMI 30.69 kg/m²     ROS:  GENERAL: Denies weight gain or weight loss. Feeling well overall.   SKIN: Denies rash or lesions.   HEAD: Denies head injury or headache.   NODES: Denies enlarged lymph nodes.   CHEST: Denies chest pain or shortness of breath.   CARDIOVASCULAR: Denies palpitations or left sided chest pain.   ABDOMEN: No abdominal pain, constipation, diarrhea, nausea, vomiting or rectal bleeding.   URINARY: No frequency, dysuria, hematuria, or burning on urination.  REPRODUCTIVE: See HPI. "   BREASTS: The patient performs breast self-examination and denies pain, lumps, or nipple discharge.   HEMATOLOGIC: No easy bruisability or excessive bleeding.   MUSCULOSKELETAL: Denies joint pain or swelling.   NEUROLOGIC: Denies syncope or weakness.   PSYCHIATRIC: Denies depression, anxiety or mood swings.    PE:   APPEARANCE: Well nourished, well developed, in no acute distress.  AFFECT: WNL, alert and oriented x 3.  SKIN: No acne or hirsutism.  NECK: Neck symmetric without masses or thyromegaly.  NODES: No inguinal, cervical, axillary or femoral lymph node enlargement.  CHEST: Good respiratory effort.   ABDOMEN: Soft. No tenderness or masses. No hepatosplenomegaly. No hernias.  BREASTS: Symmetrical, no skin changes or visible lesions. No palpable masses, nipple discharge bilaterally.  PELVIC: Normal external female genitalia without lesions. Normal hair distribution. Adequate perineal body, normal urethral meatus. Vagina atrophic without lesions or discharge. No significant cystocele or rectocele. Bimanual exam shows uterus and cervix to be surgically absent. Adnexa without masses or tenderness.  Physical Exam     1. Encounter for gynecological examination without abnormal finding        2. Encounter for screening mammogram for breast cancer  Mammo Digital Screening Bilat w/ Senthil       and PLAN:    Georgette was seen today for well woman.    Diagnoses and all orders for this visit:    Encounter for gynecological examination without abnormal finding    Encounter for screening mammogram for breast cancer  -     Mammo Digital Screening Bilat w/ Senthil; Future       Patient was counseled today on A.C.S. Pap guidelines and recommendations for yearly pelvic exams, mammograms and monthly self breast exams; to see her PCP for other health maintenance.

## 2024-01-12 ENCOUNTER — HOSPITAL ENCOUNTER (OUTPATIENT)
Dept: RADIOLOGY | Facility: HOSPITAL | Age: 64
Discharge: HOME | End: 2024-01-12
Attending: NURSE PRACTITIONER
Payer: MEDICARE

## 2024-01-12 VITALS — WEIGHT: 167.75 LBS | HEIGHT: 62 IN | BODY MASS INDEX: 30.87 KG/M2

## 2024-01-12 DIAGNOSIS — Z12.31 ENCOUNTER FOR SCREENING MAMMOGRAM FOR BREAST CANCER: ICD-10-CM

## 2024-01-12 PROCEDURE — 77067 SCR MAMMO BI INCL CAD: CPT | Mod: 26,,, | Performed by: RADIOLOGY

## 2024-01-12 PROCEDURE — 77067 SCR MAMMO BI INCL CAD: CPT | Mod: TC

## 2024-01-12 PROCEDURE — 77063 BREAST TOMOSYNTHESIS BI: CPT | Mod: 26,,, | Performed by: RADIOLOGY

## 2025-02-11 ENCOUNTER — TELEPHONE (OUTPATIENT)
Dept: OBSTETRICS AND GYNECOLOGY | Facility: CLINIC | Age: 65
End: 2025-02-11
Payer: MEDICARE

## 2025-02-11 DIAGNOSIS — Z12.31 ENCOUNTER FOR SCREENING MAMMOGRAM FOR MALIGNANT NEOPLASM OF BREAST: Primary | ICD-10-CM

## 2025-02-11 NOTE — TELEPHONE ENCOUNTER
----- Message from [place] sent at 2/11/2025  2:39 PM CST -----  Type:  Mammogram    Caller is requesting to schedule their annual mammogram appointment.  Order is not listed in EPIC.  Please enter order and contact patient to schedule.  Name of Caller: pt   Where would they like the mammogram performed? [facility]   Would the patient rather a call back or a response via [facility]?  Call   Best Call Back Number: [phone]  Additional Information:

## 2025-02-24 ENCOUNTER — HOSPITAL ENCOUNTER (OUTPATIENT)
Dept: RADIOLOGY | Facility: HOSPITAL | Age: 65
Discharge: HOME | End: 2025-02-24
Attending: NURSE PRACTITIONER
Payer: MEDICARE

## 2025-02-24 ENCOUNTER — OFFICE VISIT (OUTPATIENT)
Dept: OBSTETRICS AND GYNECOLOGY | Facility: CLINIC | Age: 65
End: 2025-02-24
Payer: MEDICARE

## 2025-02-24 VITALS
HEIGHT: 62 IN | DIASTOLIC BLOOD PRESSURE: 80 MMHG | WEIGHT: 161.63 LBS | BODY MASS INDEX: 29.74 KG/M2 | SYSTOLIC BLOOD PRESSURE: 120 MMHG

## 2025-02-24 VITALS — HEIGHT: 62 IN | WEIGHT: 160.5 LBS | BODY MASS INDEX: 29.53 KG/M2

## 2025-02-24 DIAGNOSIS — Z12.31 ENCOUNTER FOR SCREENING MAMMOGRAM FOR MALIGNANT NEOPLASM OF BREAST: ICD-10-CM

## 2025-02-24 DIAGNOSIS — Z90.710 S/P HYSTERECTOMY: ICD-10-CM

## 2025-02-24 DIAGNOSIS — Z01.419 ENCOUNTER FOR GYNECOLOGICAL EXAMINATION WITHOUT ABNORMAL FINDING: Primary | ICD-10-CM

## 2025-02-24 PROCEDURE — 77063 BREAST TOMOSYNTHESIS BI: CPT | Mod: TC

## 2025-02-24 PROCEDURE — 99999 PR PBB SHADOW E&M-EST. PATIENT-LVL III: CPT | Mod: PBBFAC,,, | Performed by: NURSE PRACTITIONER

## 2025-02-24 PROCEDURE — 77063 BREAST TOMOSYNTHESIS BI: CPT | Mod: 26,,, | Performed by: STUDENT IN AN ORGANIZED HEALTH CARE EDUCATION/TRAINING PROGRAM

## 2025-02-24 PROCEDURE — 77067 SCR MAMMO BI INCL CAD: CPT | Mod: 26,,, | Performed by: STUDENT IN AN ORGANIZED HEALTH CARE EDUCATION/TRAINING PROGRAM

## 2025-02-24 PROCEDURE — 99213 OFFICE O/P EST LOW 20 MIN: CPT | Mod: PBBFAC | Performed by: NURSE PRACTITIONER

## 2025-02-24 PROCEDURE — 99499 UNLISTED E&M SERVICE: CPT | Mod: S$PBB,,, | Performed by: NURSE PRACTITIONER

## 2025-02-24 NOTE — PROGRESS NOTES
Pt is medicare and does not need annual until after 11/29/2025 - chooses not to be seen today   Had mmg today

## 2025-02-25 ENCOUNTER — RESULTS FOLLOW-UP (OUTPATIENT)
Dept: OBSTETRICS AND GYNECOLOGY | Facility: CLINIC | Age: 65
End: 2025-02-25

## 2025-07-22 ENCOUNTER — PATIENT MESSAGE (OUTPATIENT)
Dept: RESEARCH | Facility: HOSPITAL | Age: 65
End: 2025-07-22
Payer: MEDICARE